# Patient Record
Sex: MALE | Race: WHITE | NOT HISPANIC OR LATINO | Employment: FULL TIME | URBAN - METROPOLITAN AREA
[De-identification: names, ages, dates, MRNs, and addresses within clinical notes are randomized per-mention and may not be internally consistent; named-entity substitution may affect disease eponyms.]

---

## 2022-11-25 ENCOUNTER — APPOINTMENT (OUTPATIENT)
Dept: MRI IMAGING | Facility: CLINIC | Age: 37
End: 2022-11-25
Attending: EMERGENCY MEDICINE
Payer: COMMERCIAL

## 2022-11-25 ENCOUNTER — HOSPITAL ENCOUNTER (EMERGENCY)
Facility: CLINIC | Age: 37
Discharge: HOME | End: 2022-11-25
Attending: EMERGENCY MEDICINE | Admitting: EMERGENCY MEDICINE
Payer: COMMERCIAL

## 2022-11-25 VITALS
WEIGHT: 225 LBS | DIASTOLIC BLOOD PRESSURE: 95 MMHG | HEART RATE: 83 BPM | RESPIRATION RATE: 20 BRPM | OXYGEN SATURATION: 100 % | TEMPERATURE: 97.4 F | SYSTOLIC BLOOD PRESSURE: 159 MMHG

## 2022-11-25 DIAGNOSIS — M51.27 HERNIATION OF INTERVERTEBRAL DISC BETWEEN L5 AND S1: ICD-10-CM

## 2022-11-25 PROCEDURE — 99284 EMERGENCY DEPT VISIT MOD MDM: CPT | Mod: 25

## 2022-11-25 PROCEDURE — 72148 MRI LUMBAR SPINE W/O DYE: CPT

## 2022-11-25 PROCEDURE — 70551 MRI BRAIN STEM W/O DYE: CPT

## 2022-11-25 RX ORDER — GABAPENTIN 100 MG/1
100 CAPSULE ORAL 3 TIMES DAILY
Qty: 20 CAPSULE | Refills: 0 | Status: SHIPPED | OUTPATIENT
Start: 2022-11-25

## 2022-11-25 RX ORDER — HYDROCODONE BITARTRATE AND ACETAMINOPHEN 5; 325 MG/1; MG/1
1 TABLET ORAL EVERY 6 HOURS PRN
Qty: 12 TABLET | Refills: 0 | Status: SHIPPED | OUTPATIENT
Start: 2022-11-25

## 2022-11-25 RX ORDER — LIDOCAINE 50 MG/G
1 PATCH TOPICAL EVERY 24 HOURS
Qty: 10 PATCH | Refills: 0 | Status: SHIPPED | OUTPATIENT
Start: 2022-11-25 | End: 2022-12-05

## 2022-11-25 ASSESSMENT — ACTIVITIES OF DAILY LIVING (ADL): ADLS_ACUITY_SCORE: 33

## 2022-11-25 NOTE — DISCHARGE INSTRUCTIONS
As we have discussed usually we do not do MRIs of the back but because of your history of prior stroke in the right brain and left-sided numbness we did do an MRI brain and back.  Pj brain is negative for stroke but there is evidence of a large disc extrusion at L5-S1 which would correlate to your symptoms.  Use medications as prescribed when needed.  Please follow-up with primary care provider and/or spine clinic to discuss long-term management of disc herniation avoid heavy lifting and carrying until follow-up with neurosurgery.

## 2022-11-25 NOTE — ED TRIAGE NOTES
"  Back pain over the last year. Worsened over the last few weeks. Was seen in UC, yesterday. Left foot feels \"asleep\" as of this morning around 0500 and has pins and needles, per pt. Pt went to bed at 2300, left leg was aching at that point, but no numbness/tingling/pins/needles at that time.      Denies loss of bowel/bladder. Denies trauma to back or leg. Ambulatory.     On daily aspirin. Hx TIA April 2022. BEFAST negative in triage.    Triage Assessment     Row Name 11/25/22 0601       Triage Assessment (Adult)    Airway WDL WDL       Cognitive/Neuro/Behavioral WDL    Cognitive/Neuro/Behavioral WDL WDL              "

## 2022-11-28 NOTE — ED PROVIDER NOTES
History     Chief Complaint:    Back Pain      HPI   Eber Whitlock is a 37 year old male who presents with back pain and left leg numbness.    Is a 37-year-old male who has a complicated past medical history with a history of prior right M1 occlusion stroke diagnosed at [facility].  Patient is currently on aspirin therapy but stopped Plavix a few months ago.  Patient arrives to the emergency room with complaints of left leg numbness.  He describes numbness over the side of the leg.  He has had no clear weakness.  No bowel or bladder incontinence.  Patient is also had some back pain but pain is not the issue today.  New numbness in the left leg acutely started this morning and presents to the emergency room for assessment.  Patient was seen in outpatient emergency room.  Offered steroid taper to help with likely lumbar radiculopathy.    Review of Systems  Positive for left leg numbness negative for left arm or left face numbness negative for speech difficulties positive for back pain all the systems negative except as above    Allergies:    Hydrochlorothiazide      Medications:      gabapentin (NEURONTIN) 100 MG capsule  HYDROcodone-acetaminophen (NORCO) 5-325 MG tablet  lidocaine (LIDODERM) 5 % patch        Past Medical History:      No past medical history on file.  There are no problems to display for this patient.       Past Surgical History:      No past surgical history on file.    Family History:      No family history on file.    Social History:    [employer], [place]  The patient presents to the ED with himself    Physical Exam     No data found.    Physical Exam  Vitals reviewed.   HENT:      Head: Normocephalic.      Right Ear: Tympanic membrane normal.      Left Ear: Tympanic membrane normal.      Nose: Nose normal.      Mouth/Throat:      Mouth: Mucous membranes are moist.   Eyes:      Pupils: Pupils are equal, round, and reactive to light.   Cardiovascular:      Rate and Rhythm: Normal rate.    Pulmonary:      Effort: Pulmonary effort is normal.   Abdominal:      General: Abdomen is flat.      Palpations: Abdomen is soft.   Musculoskeletal:         General: Normal range of motion.   Skin:     General: Skin is warm.      Capillary Refill: Capillary refill takes less than 2 seconds.   Neurological:      General: No focal deficit present.      Mental Status: He is alert.      Sensory: Sensory deficit present.   Psychiatric:         Mood and Affect: Mood normal.           Emergency Department Course     Imaging:  Lumbar spine MRI w/o contrast   Final Result   IMPRESSION:     1. Large disc extrusion at L4-L5 extending caudally which causes   moderate spinal canal narrowing and compresses the left L5 nerve root   in the lateral recess. This is the likely cause of left leg numbness.    2. Additional degenerative changes in the lower lumbar spine at L4-L5   and L5-S1 as detailed above. Mild neural foraminal narrowings   bilaterally at L4-L5. Mild right and moderate left neural foraminal   narrowings at L5-S1.      Results discussed with Torrey Hunt at 11:35 AM on 11/25/2022.      HAIR VELASCO MD            SYSTEM ID:  [id number]      MR Brain w/o Contrast   Final Result   IMPRESSION:      1. No evidence of acute infarct, mass, hemorrhage, or herniation.   2. Mild nonspecific white matter changes likely due to chronic   microvascular ischemic disease.    3. Mucosal thickening in the paranasal sinuses with possible air-fluid   layers. This could represent acute sinusitis in the appropriate   clinical setting.         HAIR VELASCO MD            SYSTEM ID:  [id number]        Report per radiology    Laboratory:  Labs Ordered and Resulted from Time of ED Arrival to Time of ED Departure - No data to display      Emergency Department Course:             Reviewed:    I reviewed nursing notes, vitals and Care Everywhere    Assessments:   I obtained history and examined the patient as noted above.    I rechecked the  patient and explained findings.       Consults:            Interventions:    Medications - No data to display    Disposition:  The patient was discharged to home.     Impression & Plan        Medical Decision Making:  Patient presents with left leg numbness.  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  Doubt this is patient's physical exam is consistent with likely lumbar radiculopathy but due to his young age felt uncomfortable without reimaging the brain to assess for mod extremity dysesthesias in the setting of history of prior M1 occlusion.  Care was discussed with the patient also lumbar MRI was performed due to patient's high clinical likelihood to have having L5-S1 radiculopathy based on history and clinical exam.  Central nervous system imaging is normal.  There is evidence of large disc extrusion impinging the L5-S1 nerve root.  Patient is encouraged to follow-up with spine care for reassessment patient is encouraged to seek nonsurgical options for back pain and lumbar radiculopathy due to his young age but also offered spine and brain clinic for reassessment of lumbar radiculopathy.  Patient was discharged in stable condition.      Covid-19  Eber Whitlock was evaluated during a global COVID-19 pandemic, which necessitated consideration that the patient might be at risk for infection with the SARS-CoV-2 virus that causes COVID-19.   Applicable protocols for evaluation were followed during the patient's care.   COVID-19 was considered as part of the patient's evaluation.    Diagnosis:    ICD-10-CM    1. Herniation of intervertebral disc between L5 and S1  M51.27           Discharge Medications:  Discharge Medication List as of 11/25/2022 11:46 AM      START taking these medications    Details   gabapentin (NEURONTIN) 100 MG  capsule Take 1 capsule (100 mg) by mouth 3 times daily, Disp-20 capsule, R-0, Local Print      HYDROcodone-acetaminophen (NORCO) 5-325 MG tablet Take 1 tablet by mouth every 6 hours as needed for pain, Disp-12 tablet, R-0, Local Print      lidocaine (LIDODERM) 5 % patch Place 1 patch onto the skin every 24 hours for 10 daysDisp-10 patch, R-0Local Print               Scribe Disclosure:  I, Torrey Hunt MD, am serving as a scribe at 2:35 PM on 11/28/2022 to document services personally performed by ME based on my observations and the provider's statements to me.      Torrey Hunt MD  11/28/22 2730

## 2024-03-31 ENCOUNTER — HEALTH MAINTENANCE LETTER (OUTPATIENT)
Age: 39
End: 2024-03-31

## 2025-04-13 ENCOUNTER — HEALTH MAINTENANCE LETTER (OUTPATIENT)
Age: 40
End: 2025-04-13